# Patient Record
Sex: FEMALE | Race: WHITE
[De-identification: names, ages, dates, MRNs, and addresses within clinical notes are randomized per-mention and may not be internally consistent; named-entity substitution may affect disease eponyms.]

---

## 2017-03-09 ENCOUNTER — HOSPITAL ENCOUNTER (OUTPATIENT)
Dept: HOSPITAL 39 - GMAJ | Age: 72
Discharge: HOME | End: 2017-03-09
Attending: FAMILY MEDICINE
Payer: MEDICARE

## 2017-03-09 DIAGNOSIS — M10.9: Primary | ICD-10-CM

## 2017-10-27 ENCOUNTER — HOSPITAL ENCOUNTER (OUTPATIENT)
Dept: HOSPITAL 39 - GMAJ | Age: 72
Discharge: HOME | End: 2017-10-27
Attending: FAMILY MEDICINE
Payer: MEDICARE

## 2017-10-27 DIAGNOSIS — M10.9: Primary | ICD-10-CM

## 2018-05-30 ENCOUNTER — HOSPITAL ENCOUNTER (OUTPATIENT)
Dept: HOSPITAL 39 - GMAJ | Age: 73
End: 2018-05-30
Attending: FAMILY MEDICINE
Payer: MEDICARE

## 2018-05-30 DIAGNOSIS — M10.9: Primary | ICD-10-CM

## 2018-07-11 ENCOUNTER — HOSPITAL ENCOUNTER (OUTPATIENT)
Dept: HOSPITAL 39 - MAMMO | Age: 73
End: 2018-07-11
Attending: FAMILY MEDICINE
Payer: MEDICARE

## 2018-07-11 DIAGNOSIS — Z12.31: Primary | ICD-10-CM

## 2018-07-12 NOTE — MAM
EXAM DESCRIPTION: 

3D Screening BILATERAL : Digital Mammography.



CLINICAL HISTORY: 

73 years Female ANNUAL SCREENING . No complaints. No family

history breast cancer. Postmenopausal. Childbirth. No HRT.



COMPARISON: 

2-D digital screening bilateral mammography 5/20/2015.  No prior

reports available. 



TECHNIQUE: 

Bilateral CC and MLO projection full-field images, 3-D

tomosynthesis digital mammographic technique.  CAD not utilized. 



FINDINGS: 

The breast parenchymal density pattern is:  Scattered areas of

fibroglandular density.   No skin thickening or nipple

retraction.  Bilateral solitary microcalcifications. Bilateral

axillary lymph nodes.

No new focal, stellate mass or density, focal asymmetry , and no

suspicious microcalcifications bilaterally. Stable mammograms

compared to prior study, taking into account differences in

mammographic technique.



IMPRESSION: 

BI-RADS CATEGORY: 2 - BENIGN FINDINGS.

FOLLOW UP: Routine digital bilateral  screening, one year

interval from  July 2018.



Written communication explaining the IMPRESSION and follow-up,

will be mailed to the patient and referring health care provider.



According to the [employer], yearly mammograms

are recommended starting at age 40 and continuing as long as a

woman is in good health.  Any breast change noted on a breast

self-exam should be reported promptly to the patient's healthcare

provider.  Breast MRI is recommended for women with an

approximately 20-25% or greater lifetime risk of breast cancer,

including women with a strong family history of breast or ovarian

cancer and women who have been treated for Hodgkin's disease.



A negative mammographic report should not delay tissue diagnosis

in patients with significant clinical history or physical

findings.



Extremely dense breast tissue limits the sensitivity of digital

mammography. 



Electronically signed by:  Salvador Cancino MD  7/12/2018 11:04 AM

CD[facility] Workstation: [id number]

## 2018-10-12 ENCOUNTER — HOSPITAL ENCOUNTER (OUTPATIENT)
Dept: HOSPITAL 39 - MRI | Age: 73
End: 2018-10-12
Attending: PHYSICIAN ASSISTANT
Payer: MEDICARE

## 2018-10-12 DIAGNOSIS — M25.552: ICD-10-CM

## 2018-10-12 DIAGNOSIS — M16.12: Primary | ICD-10-CM

## 2018-10-12 NOTE — MRI
Study: MRI of the Left Hip. 



Indication: PAIN IN LT HIP



Technique: Multiplanar, multi sequence MRI of the left hip was

obtained without intravenous contrast. 



Comparison: None.



Findings:



High-grade undersurface tearing at the base of the anterior

superior left hip labrum. Grade 3-4 chondral thinning throughout

the majority of the left hip joint with mild subchondral cystic

change posterior superior femoral head medially as well as at the

superolateral margin femoral head neck junction. Tiny joint line

osteophytes. No acute fracture or osteonecrosis. Tiny joint

effusion.



Pronounced lower lumbar disc disease. Moderate pubic symphysis

osteoarthritis. Tendinosis bilateral gluteus minimus/medius

tendon insertions with mild bilateral greater trochanter bursal

edema. Tendinosis bilateral hamstring tendon origins. Colonic

diverticulosis.



Impression:



Moderate left hip osteoarthritis with labral tearing. No acute

fracture or osteonecrosis.



Additional findings as above.



Electronically signed by:  Sixto Sims MD  10/12/2018 1:46 PM

CDT Workstation: [id number]

## 2019-01-08 ENCOUNTER — HOSPITAL ENCOUNTER (OUTPATIENT)
Dept: HOSPITAL 39 - GMAJ | Age: 74
End: 2019-01-08
Attending: FAMILY MEDICINE
Payer: MEDICARE

## 2019-01-08 DIAGNOSIS — M10.9: Primary | ICD-10-CM
